# Patient Record
Sex: FEMALE | Race: WHITE | NOT HISPANIC OR LATINO | Employment: UNEMPLOYED | URBAN - METROPOLITAN AREA
[De-identification: names, ages, dates, MRNs, and addresses within clinical notes are randomized per-mention and may not be internally consistent; named-entity substitution may affect disease eponyms.]

---

## 2017-04-18 DIAGNOSIS — Z79.899 LONG-TERM USE OF HYDROXYCHLOROQUINE: ICD-10-CM

## 2017-04-18 RX ORDER — HYDROXYCHLOROQUINE SULFATE 200 MG/1
200 TABLET, FILM COATED ORAL 2 TIMES DAILY
Qty: 180 TABLET | Refills: 0 | Status: SHIPPED | OUTPATIENT
Start: 2017-04-18 | End: 2017-06-14 | Stop reason: SDUPTHER

## 2017-04-18 NOTE — TELEPHONE ENCOUNTER
Last refill.  She needs to be seen at least once a year, otherwise can get meds through primary care MD.

## 2017-06-14 ENCOUNTER — OFFICE VISIT (OUTPATIENT)
Dept: RHEUMATOLOGY | Facility: CLINIC | Age: 57
End: 2017-06-14
Payer: COMMERCIAL

## 2017-06-14 ENCOUNTER — CLINICAL SUPPORT (OUTPATIENT)
Dept: INFECTIOUS DISEASES | Facility: CLINIC | Age: 57
End: 2017-06-14
Payer: COMMERCIAL

## 2017-06-14 VITALS
DIASTOLIC BLOOD PRESSURE: 90 MMHG | HEIGHT: 67 IN | BODY MASS INDEX: 22.57 KG/M2 | HEART RATE: 80 BPM | WEIGHT: 143.81 LBS | SYSTOLIC BLOOD PRESSURE: 172 MMHG

## 2017-06-14 DIAGNOSIS — M81.0 OSTEOPOROSIS, UNSPECIFIED OSTEOPOROSIS TYPE, UNSPECIFIED PATHOLOGICAL FRACTURE PRESENCE: ICD-10-CM

## 2017-06-14 DIAGNOSIS — Z79.899 LONG-TERM USE OF HYDROXYCHLOROQUINE: ICD-10-CM

## 2017-06-14 DIAGNOSIS — R03.0 ELEVATED BLOOD PRESSURE READING: ICD-10-CM

## 2017-06-14 DIAGNOSIS — M35.01 SJOGREN'S SYNDROME WITH KERATOCONJUNCTIVITIS SICCA: ICD-10-CM

## 2017-06-14 DIAGNOSIS — M32.19 OTHER SYSTEMIC LUPUS ERYTHEMATOSUS WITH OTHER ORGAN INVOLVEMENT: Primary | ICD-10-CM

## 2017-06-14 PROCEDURE — 99999 PR PBB SHADOW E&M-EST. PATIENT-LVL IV: CPT | Mod: PBBFAC,,, | Performed by: INTERNAL MEDICINE

## 2017-06-14 PROCEDURE — 99999 PR PBB SHADOW E&M-EST. PATIENT-LVL I: CPT | Mod: PBBFAC,,,

## 2017-06-14 PROCEDURE — 90670 PCV13 VACCINE IM: CPT | Mod: S$GLB,,, | Performed by: INTERNAL MEDICINE

## 2017-06-14 PROCEDURE — 99215 OFFICE O/P EST HI 40 MIN: CPT | Mod: S$GLB,,, | Performed by: INTERNAL MEDICINE

## 2017-06-14 PROCEDURE — 90471 IMMUNIZATION ADMIN: CPT | Mod: S$GLB,,, | Performed by: INTERNAL MEDICINE

## 2017-06-14 RX ORDER — DEXTROAMPHETAMINE SACCHARATE, AMPHETAMINE ASPARTATE MONOHYDRATE, DEXTROAMPHETAMINE SULFATE AND AMPHETAMINE SULFATE 5; 5; 5; 5 MG/1; MG/1; MG/1; MG/1
20 CAPSULE, EXTENDED RELEASE ORAL EVERY MORNING
COMMUNITY

## 2017-06-14 RX ORDER — HYDROXYCHLOROQUINE SULFATE 200 MG/1
200 TABLET, FILM COATED ORAL 2 TIMES DAILY
Qty: 180 TABLET | Refills: 2 | Status: SHIPPED | OUTPATIENT
Start: 2017-06-14 | End: 2018-05-21 | Stop reason: SDUPTHER

## 2017-06-14 ASSESSMENT — ROUTINE ASSESSMENT OF PATIENT INDEX DATA (RAPID3)
PAIN SCORE: 1.5
TOTAL RAPID3 SCORE: 2.61
FATIGUE SCORE: 7
AM STIFFNESS SCORE: 1, YES
PATIENT GLOBAL ASSESSMENT SCORE: 6
PSYCHOLOGICAL DISTRESS SCORE: 5.5
MDHAQ FUNCTION SCORE: .1
WHEN YOU AWAKENED IN THE MORNING OVER THE LAST WEEK, PLEASE INDICATE THE AMOUNT OF TIME IT TAKES UNTIL YOU ARE AS LIMBER AS YOU WILL BE FOR THE DAY: 15 MINUTES

## 2017-06-14 ASSESSMENT — SYSTEMIC LUPUS ERYTHEMATOSUS DISEASE ACTIVITY INDEX (SLEDAI): TOTAL_SCORE: 0

## 2017-06-14 NOTE — PROGRESS NOTES
Ms. Cervantes is a 56-year-old lady with systemic lupus erythematosus and Sjogren syndrome on HCQ; takes evoxac & restasis infrequently & prn. She also has osteoporosis, fibromyalgia with anxiety/depression, attention deficit disorder, and number of other comorbidities.     She returns today for follow-up & for a refill of HCQ.  She is seen once a year. She is followed by Dr. Pradhan at the  Clinic.  She is upset because of the traffic coming here and her initial BP was 178/100.  She denies SLE sxs. Denies swollen, painful joints, dysphagia, photosensitivity, alopecia or rashes. Still gets Raynaud's in cold. She is still under stress. She was living in her mother's house but mother was taken to a Nursing Home and the house was sold and she has been living with one or another daughter.  This is not optimal for her but she cannot afford to have her own place. She is still not  as her  has her on his insurance. She continues with dryness in her eyes and mouth but they are controlled. She takes both restasis and evoxac on a prn basis. Sometimes evoxac makes her drool  AM stiffness only 15 minutes. Continues with difficulty sleeping, fatigue, anxiety and depression. Not compliant with alendronate and has not had a DXA.  (maternal hx of hip fx).      Current Outpatient Prescriptions on File Prior to Visit   Medication Sig Dispense Refill    aspirin (ECOTRIN) 81 MG EC tablet Take 81 mg by mouth once daily.        buPROPion (WELLBUTRIN XL) 300 MG 24 hr tablet Take 300 mg by mouth once daily.        cevimeline (EVOXAC) 30 mg capsule Take 1 capsule (30 mg total) by mouth 3 (three) times daily. 270 capsule 3    cycloSPORINE (RESTASIS) 0.05 % ophthalmic emulsion Place 1 drop into both eyes 2 (two) times daily.        hydroxychloroquine (PLAQUENIL) 200 mg tablet Take 1 tablet (200 mg total) by mouth 2 (two) times daily. 180 tablet 0    alendronate (FOSAMAX) 70 MG tablet Take 1 tablet (70 mg total) by mouth  every 7 days. 4 tablet 11     No current facility-administered medications on file prior to visit.      Allergies   Allergen Reactions    Demerol [Meperidine]       mother had cardiac arrest from Demerol     She is allergic to Demerol with unknown consequences.     Past Medical History:   Diagnosis Date    Acid reflux     Allergy     Anxiety     Attention deficit disorder of adult 8/2/2012    Depression 8/2/2012    Dry eyes     Dry mouth     Fibromyalgia 8/2/2012    Osteoarthritis 8/2/2012    Systemic lupus erythematosus 8/2/2012       Past Surgical History:   Procedure Laterality Date    COLON SURGERY      partial for tx of diverticulitis   S/P bilateral cataract extractions 11 & 12/14    REVIEW OF SYSTEMS:  General: no fever, chills; has much fatigue. Gained weight; unhappy about this.   Skin: no rashes, alopecia, nail changes  Eyes: no vision loss, changes, pain; + dry eyes; + red eyes  Ears/Nose: some tinnitus; no hearing loss, epistaxis; + dry mouth;   Mouth/Throat: no oral ulcers, dental problems, sore throat, dysphagia, odynophagia.  Respiratory: no cough, pleurisy, shortness of breath, hemoptysis, dyspnea on exertion  Cardiovascular: no chest pain, palpitations, claudication  Gastrointestinal:+ heartburn; + constipation; no pyrosis, vomiting, diarrhea, melena, hematochezia, bloating.  Genitourinary: no dysuria, hematuria, nocturia, urgency  Gynecologic: +/- hot flushes; no night sweats, vaginal discharge, dysmenorrhea, miscarriages. LMP 4/11.  Hematologic: no pallor, lymphadenopathy; occasional bruising.  Neurologic: has headaches, seizures, paresthesias, muscle weakness, confusion, cognitive dysfunction  Psychiatric: still has sleep disturbance and stress;  Still has anxiety & depression; denies sxs of psychosis;.    FAMILY HISTORY: paternal great aunt with SLE and thyroid disease; mom with osteoporosis who fx hip & had a CVA  Father had DVT and  pulmonary embolus; paternal grandmother with  "thyroid    disease and 1 brother with lymphoma. 1 brother with addiction problems in the past. Maternal    grandmother with depression. She has 4 daughters.  2 grandchildren.  Mother fx hip.   SOCIAL HX: lives with one or other daughter.       PHYSICAL EXAMINATION: This is a well-developed, well-nourished,    lady, in no acute distress. Oriented x3 with normal  affect and normal cognition.   VITAL SIGNS: Ht 5' 6.5" (1.689 m)   Wt 65.2 kg (143 lb 12.8 oz)   BMI 22.86 kg/m²    Initially 178/100; /90 at end of session;  HEENT: Mouth mildly dry some salivary pooling. No ulcers. No parotid gland   swelling. Teeth ok;   NECK: Supple with no adenopathy. No thyromegaly. Carotids with good   pulsations.   LUNGS: Clear to auscultation and percussion.   CARDIAC EXAM: Regular with no murmurs, gallops or rubs.   ABDOMEN: Soft and benign with no hepatosplenomegaly. No masses. No   tenderness.   EXTREMITIES: Without clubbing, cyanosis or peripheral edema.   JOINT EXAMINATION: FROM of all joints; no   synovitis anywhere; mild squaring of the first carpometacarpal   joints R>L.   NEUROLOGIC: DTRs ok;  Sensation is   intact to light touch. Motor 5/5 prox & distal upper & lower.   Cranial 2-12 unremarkable.   MSK: mild diffuse trap tendernss;  LYMPHATICS: no cervical, inguinal or axillary LDA.    LABS:   2/27/17: Labs [facility]: CBC ok; CMP ok; TSH ok;  July 2014 (see Media): CBC, CMP, TFTs ok;  8/2/12: ESR, CRP, CBC, CMP, Vit D ok; +DEYANIRA 1:1280H; neg pro; UA 17 WC  8/15/11: ESR, CRP, CBC ok; GFR 58;  12/20/10: ESR, CRP, CBC ok; CMP AST 45 (40); UA no pr.    DXA 8/2/12: previously reviewed: TLS -2; TFN -2.5; TTH -1.7    IMPRESSION:   Systemic lupus erythematous overlapping with Sjogren syndrome   characterized by,   a) Constitutional symptoms of arthralgia, nasal ulcers, malar rash,   photosensitivity, livedo reticularis by history, and Raynaud phenomenon.   b) Sicca symptoms, especially dryness of the eyes on " Restasis and evoxac.   c) Positive DEYANIRA 1:1280 homogeneous with negative profile.   d) Positive IgM anticardiolipin antibodies in the past 31.2 (12.4 negative   lupus anticoagulant).   F) Increased fatigue on running out of HCQ    Elevated BP    Fibromyalgia syndrome with tender points, anxiety, depression, fatigue, headaches and hx of interstitial cystitis, off trazodone, lyrica and cymbalta.   On Wellbutrin for depression.    Osteoporosis by BMD with family hx--   Still not compliant with alendronate.    History of intermittent left-sided paresthesias and pain associated with headaches and history of left-sided weakness and confusion,    no longer a major complaint except for the headaches     Attention deficit disorder,   on Adderall    Osteoarthritis of the first carpometacarpal joints, right hand.     Depression    on Wellbutrin      PLAN:   Track BP and record and consult Dr. Pradhan.  UA & U pr/cnne today  Lipid panel external.  DXA ordered external  Continue HCQ 14 tablets/week with periodic eye exams.  Will decide on alendronate after DXA.  Ok to use evoxac prn  Discussed depression/anxiety/sleep and what to do about them.   Daily dedicated low impact aerobic exercise.  Ok to rtc in 1 year as per her request unless she has problems.

## 2018-05-21 DIAGNOSIS — Z79.899 LONG-TERM USE OF HYDROXYCHLOROQUINE: ICD-10-CM

## 2018-05-21 DIAGNOSIS — M32.19 OTHER SYSTEMIC LUPUS ERYTHEMATOSUS WITH OTHER ORGAN INVOLVEMENT: ICD-10-CM

## 2018-05-21 RX ORDER — HYDROXYCHLOROQUINE SULFATE 200 MG/1
TABLET, FILM COATED ORAL
Qty: 60 TABLET | Refills: 1 | Status: SHIPPED | OUTPATIENT
Start: 2018-05-21 | End: 2018-07-05 | Stop reason: SDUPTHER

## 2018-07-05 ENCOUNTER — LAB VISIT (OUTPATIENT)
Dept: LAB | Facility: HOSPITAL | Age: 58
End: 2018-07-05
Attending: INTERNAL MEDICINE
Payer: COMMERCIAL

## 2018-07-05 ENCOUNTER — OFFICE VISIT (OUTPATIENT)
Dept: RHEUMATOLOGY | Facility: CLINIC | Age: 58
End: 2018-07-05
Payer: COMMERCIAL

## 2018-07-05 VITALS
DIASTOLIC BLOOD PRESSURE: 69 MMHG | HEART RATE: 76 BPM | HEIGHT: 67 IN | SYSTOLIC BLOOD PRESSURE: 112 MMHG | BODY MASS INDEX: 23.09 KG/M2 | WEIGHT: 147.13 LBS

## 2018-07-05 DIAGNOSIS — M81.0 OSTEOPOROSIS, UNSPECIFIED OSTEOPOROSIS TYPE, UNSPECIFIED PATHOLOGICAL FRACTURE PRESENCE: ICD-10-CM

## 2018-07-05 DIAGNOSIS — M32.19 OTHER SYSTEMIC LUPUS ERYTHEMATOSUS WITH OTHER ORGAN INVOLVEMENT: ICD-10-CM

## 2018-07-05 DIAGNOSIS — Z79.899 LONG-TERM USE OF HYDROXYCHLOROQUINE: ICD-10-CM

## 2018-07-05 DIAGNOSIS — M35.01 SJOGREN'S SYNDROME WITH KERATOCONJUNCTIVITIS SICCA: ICD-10-CM

## 2018-07-05 DIAGNOSIS — M32.19 OTHER SYSTEMIC LUPUS ERYTHEMATOSUS WITH OTHER ORGAN INVOLVEMENT: Primary | ICD-10-CM

## 2018-07-05 LAB
25(OH)D3+25(OH)D2 SERPL-MCNC: 44 NG/ML
ALBUMIN SERPL BCP-MCNC: 4.2 G/DL
ALP SERPL-CCNC: 108 U/L
ALT SERPL W/O P-5'-P-CCNC: 23 U/L
ANION GAP SERPL CALC-SCNC: 9 MMOL/L
AST SERPL-CCNC: 39 U/L
BASOPHILS # BLD AUTO: 0.03 K/UL
BASOPHILS NFR BLD: 0.5 %
BILIRUB SERPL-MCNC: 0.6 MG/DL
BUN SERPL-MCNC: 17 MG/DL
C3 SERPL-MCNC: 110 MG/DL
C4 SERPL-MCNC: 33 MG/DL
CALCIUM SERPL-MCNC: 9.9 MG/DL
CHLORIDE SERPL-SCNC: 103 MMOL/L
CO2 SERPL-SCNC: 29 MMOL/L
CREAT SERPL-MCNC: 1 MG/DL
CRP SERPL-MCNC: 3.2 MG/L
DIFFERENTIAL METHOD: ABNORMAL
EOSINOPHIL # BLD AUTO: 0.1 K/UL
EOSINOPHIL NFR BLD: 1.8 %
ERYTHROCYTE [DISTWIDTH] IN BLOOD BY AUTOMATED COUNT: 12.6 %
ERYTHROCYTE [SEDIMENTATION RATE] IN BLOOD BY WESTERGREN METHOD: 11 MM/HR
EST. GFR  (AFRICAN AMERICAN): >60 ML/MIN/1.73 M^2
EST. GFR  (NON AFRICAN AMERICAN): >60 ML/MIN/1.73 M^2
GLUCOSE SERPL-MCNC: 90 MG/DL
HCT VFR BLD AUTO: 40.6 %
HGB BLD-MCNC: 13.5 G/DL
IMM GRANULOCYTES # BLD AUTO: 0.01 K/UL
IMM GRANULOCYTES NFR BLD AUTO: 0.2 %
LYMPHOCYTES # BLD AUTO: 1.8 K/UL
LYMPHOCYTES NFR BLD: 29.3 %
MCH RBC QN AUTO: 31.7 PG
MCHC RBC AUTO-ENTMCNC: 33.3 G/DL
MCV RBC AUTO: 95 FL
MONOCYTES # BLD AUTO: 0.5 K/UL
MONOCYTES NFR BLD: 7.8 %
NEUTROPHILS # BLD AUTO: 3.8 K/UL
NEUTROPHILS NFR BLD: 60.4 %
NRBC BLD-RTO: 0 /100 WBC
PLATELET # BLD AUTO: 224 K/UL
PMV BLD AUTO: 10.6 FL
POTASSIUM SERPL-SCNC: 4.6 MMOL/L
PROT SERPL-MCNC: 7.2 G/DL
RBC # BLD AUTO: 4.26 M/UL
SODIUM SERPL-SCNC: 141 MMOL/L
WBC # BLD AUTO: 6.25 K/UL

## 2018-07-05 PROCEDURE — 86235 NUCLEAR ANTIGEN ANTIBODY: CPT | Mod: 59

## 2018-07-05 PROCEDURE — 82306 VITAMIN D 25 HYDROXY: CPT

## 2018-07-05 PROCEDURE — 85025 COMPLETE CBC W/AUTO DIFF WBC: CPT

## 2018-07-05 PROCEDURE — 3008F BODY MASS INDEX DOCD: CPT | Mod: CPTII,S$GLB,, | Performed by: INTERNAL MEDICINE

## 2018-07-05 PROCEDURE — 99999 PR PBB SHADOW E&M-EST. PATIENT-LVL III: CPT | Mod: PBBFAC,,, | Performed by: INTERNAL MEDICINE

## 2018-07-05 PROCEDURE — 36415 COLL VENOUS BLD VENIPUNCTURE: CPT

## 2018-07-05 PROCEDURE — 86160 COMPLEMENT ANTIGEN: CPT | Mod: 59

## 2018-07-05 PROCEDURE — 86140 C-REACTIVE PROTEIN: CPT

## 2018-07-05 PROCEDURE — 86160 COMPLEMENT ANTIGEN: CPT

## 2018-07-05 PROCEDURE — 99214 OFFICE O/P EST MOD 30 MIN: CPT | Mod: S$GLB,,, | Performed by: INTERNAL MEDICINE

## 2018-07-05 PROCEDURE — 85651 RBC SED RATE NONAUTOMATED: CPT

## 2018-07-05 PROCEDURE — 80053 COMPREHEN METABOLIC PANEL: CPT

## 2018-07-05 PROCEDURE — 86039 ANTINUCLEAR ANTIBODIES (ANA): CPT

## 2018-07-05 PROCEDURE — 86038 ANTINUCLEAR ANTIBODIES: CPT

## 2018-07-05 PROCEDURE — 86235 NUCLEAR ANTIGEN ANTIBODY: CPT

## 2018-07-05 RX ORDER — HYDROXYCHLOROQUINE SULFATE 200 MG/1
200 TABLET, FILM COATED ORAL 2 TIMES DAILY
Qty: 60 TABLET | Refills: 1 | Status: SHIPPED | OUTPATIENT
Start: 2018-07-05 | End: 2018-09-23 | Stop reason: SDUPTHER

## 2018-07-05 RX ORDER — LISINOPRIL 10 MG/1
10 TABLET ORAL DAILY
COMMUNITY

## 2018-07-05 ASSESSMENT — ROUTINE ASSESSMENT OF PATIENT INDEX DATA (RAPID3)
PAIN SCORE: 1.5
TOTAL RAPID3 SCORE: 2
FATIGUE SCORE: 7
WHEN YOU AWAKENED IN THE MORNING OVER THE LAST WEEK, PLEASE INDICATE THE AMOUNT OF TIME IT TAKES UNTIL YOU ARE AS LIMBER AS YOU WILL BE FOR THE DAY: 45 MINUTES
PATIENT GLOBAL ASSESSMENT SCORE: 4.5
PSYCHOLOGICAL DISTRESS SCORE: 4.4
AM STIFFNESS SCORE: 1, YES
MDHAQ FUNCTION SCORE: 0

## 2018-07-05 ASSESSMENT — SYSTEMIC LUPUS ERYTHEMATOSUS DISEASE ACTIVITY INDEX (SLEDAI): TOTAL_SCORE: 0

## 2018-07-05 NOTE — PATIENT INSTRUCTIONS
Fax us your latest DXA (Bone Density)  FAX: [phone]    Ask Dr. Pradhan to do a Lipid Panel next time labs are drawn.     Daily aerobic low impact exercise.

## 2018-07-05 NOTE — PROGRESS NOTES
Ms. Cervantes is a 58-year-old lady with systemic lupus erythematosus and Sjogren syndrome on HCQ; takes evoxac & restasis infrequently & prn. She also has osteoporosis, fibromyalgia with anxiety/depression, attention deficit disorder, and number of other comorbidities.     She returns today for follow-up. She was last seen on 6/14/17. She is seen once a year. Her PMD is Dr. Pradhan at the  Clinic.  She is doing very well SLE wise. Denies swollen, painful joints, dysphagia, photosensitivity, alopecia or rashes. Still gets Raynaud's in cold. She reports eye dryness and some mouth & she takes restasis and evoxac on a prn basis. Still reporting significant difficulty sleeping & also fatigue, anxiety and depression. Thinks she had a DXA but does not recall what she was told about it. Thinks she is taking fosamax.  Has maternal hx of hip fx. Complaining about her weight. Feels she is 20 lbs over what she wants to be.     Was started on lisinopril for BP control and now BP is good.    Current Outpatient Prescriptions   Medication Sig Dispense Refill    aspirin (ECOTRIN) 81 MG EC tablet Take 81 mg by mouth once daily.        buPROPion (WELLBUTRIN XL) 300 MG 24 hr tablet Take 300 mg by mouth once daily.        cevimeline (EVOXAC) 30 mg capsule Take 1 capsule (30 mg total) by mouth 3 (three) times daily. 270 capsule 3    cycloSPORINE (RESTASIS) 0.05 % ophthalmic emulsion Place 1 drop into both eyes 2 (two) times daily.        dextroamphetamine-amphetamine (ADDERALL XR) 20 MG 24 hr capsule Take 20 mg by mouth every morning.      hydroxychloroquine (PLAQUENIL) 200 mg tablet TAKE ONE TABLET BY MOUTH TWICE DAILY  60 tablet 1    lisinopril 10 MG tablet Take 10 mg by mouth once daily.      alendronate (FOSAMAX) 70 MG tablet Take 1 tablet (70 mg total) by mouth every 7 days. 4 tablet 11     No current facility-administered medications for this visit.        Allergies   Allergen Reactions    Demerol [Meperidine]       mother  had cardiac arrest from Demerol     She is allergic to Demerol with unknown consequences.     Past Medical History:   Diagnosis Date    Acid reflux     Allergy     Anxiety     Attention deficit disorder of adult 8/2/2012    Depression 8/2/2012    Dry eyes     Dry mouth     Fibromyalgia 8/2/2012    Osteoarthritis 8/2/2012    Systemic lupus erythematosus 8/2/2012       Past Surgical History:   Procedure Laterality Date    COLON SURGERY      partial for tx of diverticulitis   S/P bilateral cataract extractions 11 & 12/14    REVIEW OF SYSTEMS:  General: no fever, chills; has much fatigue; gained weight again.   Skin: no rashes, alopecia, nail changes  Eyes: no vision loss, changes, pain; + dry eyes; + red eyes  Ears/Nose: some tinnitus; no hearing loss, epistaxis; + dry mouth;   Mouth/Throat: no oral ulcers, dental problems, sore throat, dysphagia, odynophagia.  Respiratory: no cough, pleurisy, shortness of breath, hemoptysis, dyspnea on exertion  Cardiovascular: no chest pain, palpitations, claudication  Gastrointestinal:+ heartburn; + constipation; no pyrosis, vomiting, diarrhea, melena, hematochezia, bloating.  Genitourinary: no dysuria, hematuria, nocturia, urgency  Gynecologic:no hot flushes; no night sweats, vaginal discharge, dysmenorrhea, miscarriages. LMP 4/11.  Hematologic: no pallor, lymphadenopathy; occasional bruising.  Neurologic: still w headaches; no seizures, paresthesias, muscle weakness, confusion, cognitive dysfunction  Psychiatric: still has sleep disturbance and stress; anxiety & depression; denies sxs of psychosis;.    FAMILY HISTORY: paternal great aunt with SLE and thyroid disease; mom with osteoporosis who fx hip & had a CVA  Father had DVT and  pulmonary embolus; paternal grandmother with thyroid    disease and 1 brother with lymphoma. 1 brother with addiction problems in the past. Maternal    grandmother with depression. She has 4 daughters.  2 grandchildren.  Mother fx hip.  "  SOCIAL HX: non smoker; rare alcohol.      PHYSICAL EXAMINATION: This is a well-developed, well-nourished,    lady, in no acute distress. Oriented x3 with normal  affect and normal cognition.   VITAL SIGNS: /69   Pulse 76   Ht 5' 6.5" (1.689 m)   Wt 66.7 kg (147 lb 1.6 oz)   BMI 23.39 kg/m²     HEENT: Mouth with some salivary pooling. No ulcers. No parotid gland   swelling. Teeth ok;   NECK: Supple with no adenopathy. No thyromegaly. Carotids with good   pulsations.   LUNGS: Clear to auscultation and percussion.   CARDIAC EXAM: Regular with no murmurs, gallops or rubs.   ABDOMEN: Soft and benign with no hepatosplenomegaly. No masses. No   tenderness.   EXTREMITIES: Without clubbing, cyanosis or peripheral edema.   JOINT EXAMINATION: FROM of all joints; no   synovitis anywhere; mild squaring of the first carpometacarpal   joints R>L.   NEUROLOGIC: DTRs ok;  Sensation is   intact to light touch. Motor 5/5 prox & distal upper & lower.   Cranial 2-12 unremarkable.   MSK: mild diffuse trap tendernss;  LYMPHATICS: no cervical, inguinal or axillary LDA.    LABS:   6/14/17: UA ok; U pr/cnne 0.13;   2/27/17: Labs [facility]: CBC ok; CMP ok; TSH ok;  July 2014 (see Media): CBC, CMP, TFTs ok;  8/2/12: ESR, CRP, CBC, CMP, Vit D ok; +DEYANIRA 1:1280H; neg pro; UA 17 WC  8/15/11: ESR, CRP, CBC ok; GFR 58;  12/20/10: ESR, CRP, CBC ok; CMP AST 45 (40); UA no pr.    DXA 8/2/12: previously reviewed: TLS -2; TFN -2.5; TTH -1.7    IMPRESSION:   Systemic lupus erythematous overlapping with Sjogren syndrome   characterized by,   a) Constitutional symptoms of arthralgia, nasal ulcers, malar rash,   photosensitivity, livedo reticularis by history, and Raynaud phenomenon.   b) Sicca symptoms, especially dryness of the eyes on Restasis and evoxac.   c) Positive DEYANIRA 1:1280 homogeneous with negative profile.   d) Positive IgM anticardiolipin antibodies in the past 31.2 (12.4 negative   lupus anticoagulant).   F) " Increased fatigue on running out of HCQ    Osteoporosis by BMD with family hx   DXA 8/2/12: TLS -2; TFN -2.5; TTH -1.7   On alendronate?    Fibromyalgia syndrome with tender points, anxiety, depression, fatigue, headaches and hx of interstitial cystitis, off trazodone, lyrica and cymbalta.   On Wellbutrin for depression.    History of intermittent left-sided paresthesias and pain associated with headaches and history of left-sided weakness and confusion,    no longer a major complaint except for the headaches     Attention deficit disorder,   on Adderall    Osteoarthritis of the first carpometacarpal joints, right hand.     Depression    on Wellbutrin      PLAN:   Labs today.  Lipid panel will be obtained by Dr. Pradhan.  Continue HCQ 14 tablets/week with periodic eye exams.  Fax us DXA.  Ok to use evoxac prn  Again counseled on daily dedicated low impact aerobic exercise..   RTC 1 year or prn.

## 2018-07-06 LAB
ANA SER QL IF: POSITIVE
ANA TITR SER IF: NORMAL {TITER}
ANTI-SSA ANTIBODY: 0.55 EU
ANTI-SSA INTERPRETATION: NEGATIVE

## 2018-07-09 LAB
ANTI SM ANTIBODY: 0.34 EU
ANTI SM/RNP ANTIBODY: 1.06 EU
ANTI-SM INTERPRETATION: NEGATIVE
ANTI-SM/RNP INTERPRETATION: NEGATIVE
ANTI-SSA ANTIBODY: 0.55 EU
ANTI-SSA INTERPRETATION: NEGATIVE
ANTI-SSB ANTIBODY: 0.2 EU
ANTI-SSB INTERPRETATION: NEGATIVE
DSDNA AB SER-ACNC: NORMAL [IU]/ML

## 2018-09-23 DIAGNOSIS — Z79.899 LONG-TERM USE OF HYDROXYCHLOROQUINE: ICD-10-CM

## 2018-09-23 DIAGNOSIS — M35.01 SJOGREN'S SYNDROME WITH KERATOCONJUNCTIVITIS SICCA: ICD-10-CM

## 2018-09-23 DIAGNOSIS — M32.19 OTHER SYSTEMIC LUPUS ERYTHEMATOSUS WITH OTHER ORGAN INVOLVEMENT: ICD-10-CM

## 2018-09-23 RX ORDER — HYDROXYCHLOROQUINE SULFATE 200 MG/1
TABLET, FILM COATED ORAL
Qty: 60 TABLET | Refills: 0 | Status: SHIPPED | OUTPATIENT
Start: 2018-09-23 | End: 2018-11-07 | Stop reason: SDUPTHER

## 2018-11-07 DIAGNOSIS — M32.19 OTHER SYSTEMIC LUPUS ERYTHEMATOSUS WITH OTHER ORGAN INVOLVEMENT: ICD-10-CM

## 2018-11-07 DIAGNOSIS — M35.01 SJOGREN'S SYNDROME WITH KERATOCONJUNCTIVITIS SICCA: ICD-10-CM

## 2018-11-07 DIAGNOSIS — Z79.899 LONG-TERM USE OF HYDROXYCHLOROQUINE: ICD-10-CM

## 2018-11-07 RX ORDER — HYDROXYCHLOROQUINE SULFATE 200 MG/1
TABLET, FILM COATED ORAL
Qty: 60 TABLET | Refills: 0 | Status: SHIPPED | OUTPATIENT
Start: 2018-11-07 | End: 2018-12-04 | Stop reason: SDUPTHER

## 2018-12-04 DIAGNOSIS — Z79.899 LONG-TERM USE OF HYDROXYCHLOROQUINE: ICD-10-CM

## 2018-12-04 DIAGNOSIS — M32.19 OTHER SYSTEMIC LUPUS ERYTHEMATOSUS WITH OTHER ORGAN INVOLVEMENT: ICD-10-CM

## 2018-12-04 DIAGNOSIS — M35.01 SJOGREN'S SYNDROME WITH KERATOCONJUNCTIVITIS SICCA: ICD-10-CM

## 2018-12-04 RX ORDER — HYDROXYCHLOROQUINE SULFATE 200 MG/1
TABLET, FILM COATED ORAL
Qty: 60 TABLET | Refills: 0 | Status: SHIPPED | OUTPATIENT
Start: 2018-12-04 | End: 2019-01-15 | Stop reason: SDUPTHER

## 2019-01-15 DIAGNOSIS — Z79.899 LONG-TERM USE OF HYDROXYCHLOROQUINE: ICD-10-CM

## 2019-01-15 DIAGNOSIS — M35.01 SJOGREN'S SYNDROME WITH KERATOCONJUNCTIVITIS SICCA: ICD-10-CM

## 2019-01-15 DIAGNOSIS — M32.19 OTHER SYSTEMIC LUPUS ERYTHEMATOSUS WITH OTHER ORGAN INVOLVEMENT: ICD-10-CM

## 2019-01-15 RX ORDER — HYDROXYCHLOROQUINE SULFATE 200 MG/1
TABLET, FILM COATED ORAL
Qty: 60 TABLET | Refills: 0 | Status: SHIPPED | OUTPATIENT
Start: 2019-01-15 | End: 2019-02-18 | Stop reason: SDUPTHER

## 2019-02-18 DIAGNOSIS — M32.19 OTHER SYSTEMIC LUPUS ERYTHEMATOSUS WITH OTHER ORGAN INVOLVEMENT: ICD-10-CM

## 2019-02-18 DIAGNOSIS — M35.01 SJOGREN'S SYNDROME WITH KERATOCONJUNCTIVITIS SICCA: ICD-10-CM

## 2019-02-18 DIAGNOSIS — Z79.899 LONG-TERM USE OF HYDROXYCHLOROQUINE: ICD-10-CM

## 2019-02-18 RX ORDER — HYDROXYCHLOROQUINE SULFATE 200 MG/1
TABLET, FILM COATED ORAL
Qty: 60 TABLET | Refills: 0 | Status: SHIPPED | OUTPATIENT
Start: 2019-02-18 | End: 2019-03-17 | Stop reason: SDUPTHER

## 2019-03-17 DIAGNOSIS — M32.19 OTHER SYSTEMIC LUPUS ERYTHEMATOSUS WITH OTHER ORGAN INVOLVEMENT: ICD-10-CM

## 2019-03-17 DIAGNOSIS — M35.01 SJOGREN'S SYNDROME WITH KERATOCONJUNCTIVITIS SICCA: ICD-10-CM

## 2019-03-17 DIAGNOSIS — Z79.899 LONG-TERM USE OF HYDROXYCHLOROQUINE: ICD-10-CM

## 2019-03-17 RX ORDER — HYDROXYCHLOROQUINE SULFATE 200 MG/1
TABLET, FILM COATED ORAL
Qty: 60 TABLET | Refills: 0 | Status: SHIPPED | OUTPATIENT
Start: 2019-03-17 | End: 2019-04-19 | Stop reason: SDUPTHER

## 2019-04-19 DIAGNOSIS — M32.19 OTHER SYSTEMIC LUPUS ERYTHEMATOSUS WITH OTHER ORGAN INVOLVEMENT: ICD-10-CM

## 2019-04-19 DIAGNOSIS — Z79.899 LONG-TERM USE OF HYDROXYCHLOROQUINE: ICD-10-CM

## 2019-04-19 DIAGNOSIS — M35.01 SJOGREN'S SYNDROME WITH KERATOCONJUNCTIVITIS SICCA: ICD-10-CM

## 2019-04-19 RX ORDER — HYDROXYCHLOROQUINE SULFATE 200 MG/1
TABLET, FILM COATED ORAL
Qty: 60 TABLET | Refills: 0 | Status: SHIPPED | OUTPATIENT
Start: 2019-04-19 | End: 2019-05-19 | Stop reason: SDUPTHER

## 2019-05-19 DIAGNOSIS — Z79.899 LONG-TERM USE OF HYDROXYCHLOROQUINE: ICD-10-CM

## 2019-05-19 DIAGNOSIS — M35.01 SJOGREN'S SYNDROME WITH KERATOCONJUNCTIVITIS SICCA: ICD-10-CM

## 2019-05-19 DIAGNOSIS — M32.19 OTHER SYSTEMIC LUPUS ERYTHEMATOSUS WITH OTHER ORGAN INVOLVEMENT: ICD-10-CM

## 2019-05-19 RX ORDER — HYDROXYCHLOROQUINE SULFATE 200 MG/1
TABLET, FILM COATED ORAL
Qty: 60 TABLET | Refills: 0 | Status: SHIPPED | OUTPATIENT
Start: 2019-05-19 | End: 2019-06-22 | Stop reason: SDUPTHER

## 2019-06-22 DIAGNOSIS — Z79.899 LONG-TERM USE OF HYDROXYCHLOROQUINE: ICD-10-CM

## 2019-06-22 DIAGNOSIS — M35.01 SJOGREN'S SYNDROME WITH KERATOCONJUNCTIVITIS SICCA: ICD-10-CM

## 2019-06-22 DIAGNOSIS — M32.19 OTHER SYSTEMIC LUPUS ERYTHEMATOSUS WITH OTHER ORGAN INVOLVEMENT: ICD-10-CM

## 2019-06-23 RX ORDER — HYDROXYCHLOROQUINE SULFATE 200 MG/1
TABLET, FILM COATED ORAL
Qty: 60 TABLET | Refills: 0 | Status: SHIPPED | OUTPATIENT
Start: 2019-06-23 | End: 2019-07-23 | Stop reason: SDUPTHER

## 2019-07-23 DIAGNOSIS — Z79.899 LONG-TERM USE OF HYDROXYCHLOROQUINE: ICD-10-CM

## 2019-07-23 DIAGNOSIS — M32.19 OTHER SYSTEMIC LUPUS ERYTHEMATOSUS WITH OTHER ORGAN INVOLVEMENT: ICD-10-CM

## 2019-07-23 DIAGNOSIS — M35.01 SJOGREN'S SYNDROME WITH KERATOCONJUNCTIVITIS SICCA: ICD-10-CM

## 2019-07-23 RX ORDER — HYDROXYCHLOROQUINE SULFATE 200 MG/1
TABLET, FILM COATED ORAL
Qty: 60 TABLET | Refills: 0 | Status: SHIPPED | OUTPATIENT
Start: 2019-07-23 | End: 2019-08-20 | Stop reason: SDUPTHER

## 2019-08-20 DIAGNOSIS — Z79.899 LONG-TERM USE OF HYDROXYCHLOROQUINE: ICD-10-CM

## 2019-08-20 DIAGNOSIS — M32.19 OTHER SYSTEMIC LUPUS ERYTHEMATOSUS WITH OTHER ORGAN INVOLVEMENT: ICD-10-CM

## 2019-08-20 DIAGNOSIS — M35.01 SJOGREN'S SYNDROME WITH KERATOCONJUNCTIVITIS SICCA: ICD-10-CM

## 2019-08-20 RX ORDER — HYDROXYCHLOROQUINE SULFATE 200 MG/1
TABLET, FILM COATED ORAL
Qty: 60 TABLET | Refills: 0 | Status: SHIPPED | OUTPATIENT
Start: 2019-08-20 | End: 2019-09-30 | Stop reason: SDUPTHER

## 2019-09-30 DIAGNOSIS — Z79.899 LONG-TERM USE OF HYDROXYCHLOROQUINE: ICD-10-CM

## 2019-09-30 DIAGNOSIS — M35.01 SJOGREN'S SYNDROME WITH KERATOCONJUNCTIVITIS SICCA: ICD-10-CM

## 2019-09-30 DIAGNOSIS — M32.19 OTHER SYSTEMIC LUPUS ERYTHEMATOSUS WITH OTHER ORGAN INVOLVEMENT: ICD-10-CM

## 2019-09-30 RX ORDER — HYDROXYCHLOROQUINE SULFATE 200 MG/1
200 TABLET, FILM COATED ORAL 2 TIMES DAILY
Qty: 60 TABLET | Refills: 0 | Status: SHIPPED | OUTPATIENT
Start: 2019-09-30 | End: 2019-11-26 | Stop reason: SDUPTHER

## 2019-11-03 DIAGNOSIS — Z79.899 LONG-TERM USE OF HYDROXYCHLOROQUINE: ICD-10-CM

## 2019-11-03 DIAGNOSIS — M35.01 SJOGREN'S SYNDROME WITH KERATOCONJUNCTIVITIS SICCA: ICD-10-CM

## 2019-11-03 DIAGNOSIS — M32.19 OTHER SYSTEMIC LUPUS ERYTHEMATOSUS WITH OTHER ORGAN INVOLVEMENT: ICD-10-CM

## 2019-11-03 RX ORDER — HYDROXYCHLOROQUINE SULFATE 200 MG/1
TABLET, FILM COATED ORAL
Qty: 60 TABLET | Refills: 0 | OUTPATIENT
Start: 2019-11-03

## 2019-11-20 DIAGNOSIS — M35.01 SJOGREN'S SYNDROME WITH KERATOCONJUNCTIVITIS SICCA: ICD-10-CM

## 2019-11-20 DIAGNOSIS — Z79.899 LONG-TERM USE OF HYDROXYCHLOROQUINE: ICD-10-CM

## 2019-11-20 DIAGNOSIS — M32.19 OTHER SYSTEMIC LUPUS ERYTHEMATOSUS WITH OTHER ORGAN INVOLVEMENT: ICD-10-CM

## 2019-11-20 RX ORDER — HYDROXYCHLOROQUINE SULFATE 200 MG/1
TABLET, FILM COATED ORAL
Qty: 60 TABLET | Refills: 0 | OUTPATIENT
Start: 2019-11-20

## 2019-11-26 ENCOUNTER — TELEPHONE (OUTPATIENT)
Dept: RHEUMATOLOGY | Facility: CLINIC | Age: 59
End: 2019-11-26

## 2019-11-26 DIAGNOSIS — M35.01 SJOGREN'S SYNDROME WITH KERATOCONJUNCTIVITIS SICCA: ICD-10-CM

## 2019-11-26 DIAGNOSIS — Z79.899 LONG-TERM USE OF HYDROXYCHLOROQUINE: ICD-10-CM

## 2019-11-26 DIAGNOSIS — M32.19 OTHER SYSTEMIC LUPUS ERYTHEMATOSUS WITH OTHER ORGAN INVOLVEMENT: ICD-10-CM

## 2019-11-26 RX ORDER — HYDROXYCHLOROQUINE SULFATE 200 MG/1
200 TABLET, FILM COATED ORAL 2 TIMES DAILY
Qty: 60 TABLET | Refills: 3 | Status: SHIPPED | OUTPATIENT
Start: 2019-11-26 | End: 2020-03-21

## 2019-11-27 NOTE — TELEPHONE ENCOUNTER
----- Message from Mishel Huntley RN sent at 11/26/2019  5:44 PM CST -----  Contact: Pt  Patient has appointment scheduled for 2/5/20  ----- Message -----  From: Angle Kowalski  Sent: 11/25/2019  12:43 PM CST  To: [facility]ER[facility] Staff    Pt stated that the pharmacy won't refill hydroxychloroquine (PLAQUENIL) 200 mg tablet      Callback [phone] (home)

## 2020-02-01 NOTE — PROGRESS NOTES
Ms. Cervantes is a 59-year-old lady with systemic lupus erythematosus and Sjogren syndrome on HCQ; takes evoxac & restasis infrequently & prn. She also has osteoporosis, fibromyalgia with anxiety/depression, attention deficit disorder, and number of other morbidities.     She returns today for follow-up. She was last seen on 7/5/18. She needs to see us at least once a year and have eye exams if we are to continue to prescribe HCQ.  She still needs an eye exam which she states she will schedule ASAP.  She is doing very well. She denies any joint pain except for an area in her R antecubital fossa which has been hurting her on palpation. There is a small nodule here. She denies any trauma, (but may get labs drawn here).  Denies  dysphagia, photosensitivity, alopecia or rashes. Still gets Raynaud's in cold. She reports eye dryness and some mouth dryness & they are not very bothersome. She takes restasis and evoxac on a prn basis.    Has been exercising and feeling better.     Current Outpatient Medications   Medication Sig Dispense Refill    buPROPion (WELLBUTRIN XL) 300 MG 24 hr tablet Take 300 mg by mouth once daily.        dextroamphetamine-amphetamine (ADDERALL XR) 20 MG 24 hr capsule Take 20 mg by mouth every morning.      hydroxychloroquine (PLAQUENIL) 200 mg tablet Take 1 tablet (200 mg total) by mouth 2 (two) times daily. Last refill. Needs appt or other MDs can fill. 60 tablet 3    lisinopril 10 MG tablet Take 10 mg by mouth once daily.      cevimeline (EVOXAC) 30 mg capsule Take 1 capsule (30 mg total) by mouth 3 (three) times daily. (Patient not taking: Reported on 2/5/2020) 270 capsule 3    cycloSPORINE (RESTASIS) 0.05 % ophthalmic emulsion Place 1 drop into both eyes 2 (two) times daily.         No current facility-administered medications for this visit.          Allergies   Allergen Reactions    Demerol [Meperidine]       mother had cardiac arrest from Demerol     She is allergic to Demerol with  unknown consequences.     Past Medical History:   Diagnosis Date    Acid reflux     Allergy     Anxiety     Attention deficit disorder of adult 8/2/2012    Depression 8/2/2012    Dry eyes     Dry mouth     Fibromyalgia 8/2/2012    Osteoarthritis 8/2/2012    Systemic lupus erythematosus 8/2/2012       Past Surgical History:   Procedure Laterality Date    COLON SURGERY      partial for tx of diverticulitis   S/P bilateral cataract extractions 11 & 12/14    REVIEW OF SYSTEMS:  General: no fever, chills; has much fatigue; gained weight again.   Skin: no rashes, alopecia, nail changes  Eyes: no vision loss, changes, pain; + dry eyes;  Ears/Nose: some tinnitus; no hearing loss, epistaxis; + dry mouth;   Mouth/Throat: no oral ulcers, dental problems, sore throat, dysphagia, odynophagia.  Respiratory: no cough, pleurisy, shortness of breath, hemoptysis, dyspnea on exertion  Cardiovascular: no chest pain, palpitations, claudication  Gastrointestinal:+ heartburn; + constipation; no pyrosis, vomiting, diarrhea, melena, hematochezia, bloating.  Genitourinary: no dysuria, hematuria, nocturia, urgency  Gynecologic:no hot flushes; no night sweats, vaginal discharge, dysmenorrhea, miscarriages. LMP 4/11.  Hematologic: no pallor, lymphadenopathy; occasional bruising.  Neurologic: still w headaches; no seizures, paresthesias, muscle weakness, confusion, cognitive dysfunction  Psychiatric: sleep disturbance anxiety & depression have markedly improved.    FAMILY HISTORY: paternal great aunt with SLE and thyroid disease; mom with osteoporosis who fx hip & had a CVA  Father had DVT and  pulmonary embolus; paternal grandmother with thyroid    disease and 1 brother with lymphoma. 1 brother with addiction problems in the past.   Maternal  grandmother with depression. She has 4 daughters;  3 grandchildren.    SOCIAL HX: non smoker; rare alcohol.  Walking every day.   Not working, but taking care of grandchildren      PHYSICAL  "EXAMINATION: This is a well-developed, well-nourished,    lady, in no acute distress. Oriented x3 with normal  affect and normal cognition.   VITAL SIGNS:   /86   Pulse 78   Ht 5' 6.5" (1.689 m)   Wt 63.5 kg (139 lb 15.9 oz)   BMI 22.26 kg/m²     Was 147 lb 1.6 oz on 7/5/18  Was 125 lb 11.2 oz on 4/8/13  HEENT: Mouth with some salivary pooling. No ulcers. No parotid gland   swelling. Teeth ok;   NECK: Supple with no adenopathy. No thyromegaly. Carotids with good   pulsations.   LUNGS: Clear to auscultation and percussion.   CARDIAC EXAM: Regular with no murmurs, gallops or rubs.   ABDOMEN: Soft and benign with no hepatosplenomegaly. No masses. No   tenderness.   EXTREMITIES: Without clubbing, cyanosis or peripheral edema.   JOINT EXAMINATION: FROM of all joints; no   synovitis anywhere; mild squaring of the first carpometacarpal   joints R>L.; R antecubital fossa with tiny sc nodule TTP  NEUROLOGIC: DTRs ok;  Sensation is   intact to light touch. Motor 5/5 prox & distal upper & lower.   Cranial 2-12 unremarkable.   MSK: ok  LYMPHATICS: no cervical, inguinal or axillary LDA.    LABS:   7/5/18: ESR 11; CRP 3.2; CBC ok; CMP ok; Vit D 44; DEYANIRA 1:160 H; neg pro; C3 110; C4 33;   6/14/17: UA ok; U pr/cnne 0.13;   2/27/17: Labs [facility]: CBC ok; CMP ok; TSH ok;  July 2014 (see Media): CBC, CMP, TFTs ok;  8/2/12: ESR, CRP, CBC, CMP, Vit D ok; +DEYANIRA 1:1280H; neg pro; UA 17 WC  8/15/11: ESR, CRP, CBC ok; GFR 58;  12/20/10: ESR, CRP, CBC ok; CMP AST 45 (40); UA no pr.    DXA 8/2/12: previously reviewed: TLS -2; TFN -2.5; TTH -1.7    IMPRESSION:   Systemic lupus erythematous overlapping with Sjogren syndrome   characterized by,   a) Constitutional symptoms of arthralgia, nasal ulcers, malar rash,   photosensitivity, livedo reticularis by history, and Raynaud phenomenon.   b) Sicca symptoms, especially dryness of the eyes on Restasis and evoxac.   c) Positive DEYANIRA 1:1280 homogeneous with negative " profile.   d) Positive IgM anticardiolipin antibodies in the past 31.2 (12.4 negative   lupus anticoagulant).   F) Increased fatigue on running out of HCQ in past    R antecubital fossa nodule w pain   prob small vein thrombosis due to blood drawing.   R/O calcification    Osteoporosis by BMD with maternal hx of hip fx   DXA 8/2/12: TLS -2; TFN -2.5; TTH -1.7   Was on alendronate in past.    Hx of Vitamin D insufficiency--remotely    Fibromyalgia syndrome with tender points, anxiety, depression, fatigue, headaches and hx of interstitial cystitis, off trazodone, lyrica and cymbalta.   On Wellbutrin for depression.    History of intermittent left-sided paresthesias and pain associated with headaches and history of left-sided weakness and confusion,    no longer a major complaint     Attention deficit disorder,   on Adderall    Osteoarthritis of the first carpometacarpal joints, right hand.     Depression    on Wellbutrin      PLAN:   Labs today.  Lipid panel will be obtained by Dr. Pradhan.  Continue HCQ but drop to 200 mg/d.  Continue eye exams  Ok to use evoxac prn  Imaging of antecubital fossa  Asked her again to fax us her DXA  Continue exercising.   RTC 1 year or prn.        CC: Velasquez Pradhan MD    Addendum: 2/5/20: ESR 15; CRP 2.1; CBC hi indices; CMP cnne 1.1; GFR 55.1;     2/5/20: R elbow: personally viewed: unremarkable.     Addendum II: will inform patient not to take NSAIDs, but ok to take baby ASA 81 mg/d x 3 months to see if it will resolve R antecubital nodule/tenderness    Addendum III: DXA 8/31/17: TLS -2.8; TFN -2.7; TTH -1.9; will contact patient to recommend treatment.

## 2020-02-05 ENCOUNTER — HOSPITAL ENCOUNTER (OUTPATIENT)
Dept: RADIOLOGY | Facility: HOSPITAL | Age: 60
Discharge: HOME | End: 2020-02-05
Attending: INTERNAL MEDICINE
Payer: COMMERCIAL

## 2020-02-05 ENCOUNTER — OFFICE VISIT (OUTPATIENT)
Dept: RHEUMATOLOGY | Facility: CLINIC | Age: 60
End: 2020-02-05
Payer: COMMERCIAL

## 2020-02-05 VITALS
HEART RATE: 78 BPM | DIASTOLIC BLOOD PRESSURE: 86 MMHG | HEIGHT: 67 IN | SYSTOLIC BLOOD PRESSURE: 139 MMHG | BODY MASS INDEX: 21.97 KG/M2 | WEIGHT: 140 LBS

## 2020-02-05 DIAGNOSIS — Z79.899 LONG-TERM USE OF HYDROXYCHLOROQUINE: ICD-10-CM

## 2020-02-05 DIAGNOSIS — M35.01 SJOGREN'S SYNDROME WITH KERATOCONJUNCTIVITIS SICCA: ICD-10-CM

## 2020-02-05 DIAGNOSIS — Z55.9 EDUCATIONAL CIRCUMSTANCE: ICD-10-CM

## 2020-02-05 DIAGNOSIS — E55.9 VITAMIN D INSUFFICIENCY: ICD-10-CM

## 2020-02-05 DIAGNOSIS — I82.712: ICD-10-CM

## 2020-02-05 DIAGNOSIS — M32.19 OTHER SYSTEMIC LUPUS ERYTHEMATOSUS WITH OTHER ORGAN INVOLVEMENT: Primary | ICD-10-CM

## 2020-02-05 DIAGNOSIS — M81.0 OSTEOPOROSIS WITHOUT CURRENT PATHOLOGICAL FRACTURE, UNSPECIFIED OSTEOPOROSIS TYPE: ICD-10-CM

## 2020-02-05 PROCEDURE — 99999 PR PBB SHADOW E&M-EST. PATIENT-LVL III: ICD-10-PCS | Mod: PBBFAC,,, | Performed by: INTERNAL MEDICINE

## 2020-02-05 PROCEDURE — 3008F BODY MASS INDEX DOCD: CPT | Mod: CPTII,S$GLB,, | Performed by: INTERNAL MEDICINE

## 2020-02-05 PROCEDURE — 73070 X-RAY EXAM OF ELBOW: CPT | Mod: TC,RT

## 2020-02-05 PROCEDURE — 99214 OFFICE O/P EST MOD 30 MIN: CPT | Mod: S$GLB,,, | Performed by: INTERNAL MEDICINE

## 2020-02-05 PROCEDURE — 73070 X-RAY EXAM OF ELBOW: CPT | Mod: 26,RT,, | Performed by: SPECIALIST

## 2020-02-05 PROCEDURE — 73070 XR ELBOW 2 VIEWS RIGHT: ICD-10-PCS | Mod: 26,RT,, | Performed by: SPECIALIST

## 2020-02-05 PROCEDURE — 3008F PR BODY MASS INDEX (BMI) DOCUMENTED: ICD-10-PCS | Mod: CPTII,S$GLB,, | Performed by: INTERNAL MEDICINE

## 2020-02-05 PROCEDURE — 99999 PR PBB SHADOW E&M-EST. PATIENT-LVL III: CPT | Mod: PBBFAC,,, | Performed by: INTERNAL MEDICINE

## 2020-02-05 PROCEDURE — 99214 PR OFFICE/OUTPT VISIT, EST, LEVL IV, 30-39 MIN: ICD-10-PCS | Mod: S$GLB,,, | Performed by: INTERNAL MEDICINE

## 2020-02-05 SDOH — SOCIAL DETERMINANTS OF HEALTH (SDOH): PROBLEMS RELATED TO EDUCATION AND LITERACY, UNSPECIFIED: Z55.9

## 2020-02-05 ASSESSMENT — ROUTINE ASSESSMENT OF PATIENT INDEX DATA (RAPID3)
FATIGUE SCORE: 4
TOTAL RAPID3 SCORE: 1.26
WHEN YOU AWAKENED IN THE MORNING OVER THE LAST WEEK, PLEASE INDICATE THE AMOUNT OF TIME IT TAKES UNTIL YOU ARE AS LIMBER AS YOU WILL BE FOR THE DAY: 10MIN
PSYCHOLOGICAL DISTRESS SCORE: 4.4
PAIN SCORE: 1.1
PATIENT GLOBAL ASSESSMENT SCORE: 2
MDHAQ FUNCTION SCORE: .2
AM STIFFNESS SCORE: 1, YES

## 2020-02-05 ASSESSMENT — SYSTEMIC LUPUS ERYTHEMATOSUS DISEASE ACTIVITY INDEX (SLEDAI): TOTAL_SCORE: 0

## 2020-02-05 NOTE — PATIENT INSTRUCTIONS
Please follow up with Ophthalmology for hydroxychloroquine.  Drop hydroxychloroquine to 1 tablet daily & let us know if you should develop any rashes or other symptoms.    Send us the DXA or Bone Density: [phone]

## 2020-02-17 ENCOUNTER — PATIENT MESSAGE (OUTPATIENT)
Dept: RHEUMATOLOGY | Facility: CLINIC | Age: 60
End: 2020-02-17

## 2020-03-21 DIAGNOSIS — Z79.899 LONG-TERM USE OF HYDROXYCHLOROQUINE: ICD-10-CM

## 2020-03-21 DIAGNOSIS — M32.19 OTHER SYSTEMIC LUPUS ERYTHEMATOSUS WITH OTHER ORGAN INVOLVEMENT: ICD-10-CM

## 2020-03-21 DIAGNOSIS — M35.01 SJOGREN'S SYNDROME WITH KERATOCONJUNCTIVITIS SICCA: ICD-10-CM

## 2020-03-21 RX ORDER — HYDROXYCHLOROQUINE SULFATE 200 MG/1
TABLET, FILM COATED ORAL
Qty: 60 TABLET | Refills: 0 | Status: SHIPPED | OUTPATIENT
Start: 2020-03-21 | End: 2020-04-24

## 2020-04-24 DIAGNOSIS — M32.19 OTHER SYSTEMIC LUPUS ERYTHEMATOSUS WITH OTHER ORGAN INVOLVEMENT: ICD-10-CM

## 2020-04-24 DIAGNOSIS — M35.01 SJOGREN'S SYNDROME WITH KERATOCONJUNCTIVITIS SICCA: ICD-10-CM

## 2020-04-24 DIAGNOSIS — Z79.899 LONG-TERM USE OF HYDROXYCHLOROQUINE: ICD-10-CM

## 2020-04-24 RX ORDER — HYDROXYCHLOROQUINE SULFATE 200 MG/1
TABLET, FILM COATED ORAL
Qty: 60 TABLET | Refills: 0 | Status: SHIPPED | OUTPATIENT
Start: 2020-04-24 | End: 2020-05-23

## 2020-05-22 ENCOUNTER — PATIENT MESSAGE (OUTPATIENT)
Dept: RHEUMATOLOGY | Facility: CLINIC | Age: 60
End: 2020-05-22

## 2020-05-23 DIAGNOSIS — Z79.899 LONG-TERM USE OF HYDROXYCHLOROQUINE: ICD-10-CM

## 2020-05-23 DIAGNOSIS — M32.19 OTHER SYSTEMIC LUPUS ERYTHEMATOSUS WITH OTHER ORGAN INVOLVEMENT: ICD-10-CM

## 2020-05-23 DIAGNOSIS — M35.01 SJOGREN'S SYNDROME WITH KERATOCONJUNCTIVITIS SICCA: ICD-10-CM

## 2020-05-23 RX ORDER — HYDROXYCHLOROQUINE SULFATE 200 MG/1
TABLET, FILM COATED ORAL
Qty: 60 TABLET | Refills: 3 | Status: SHIPPED | OUTPATIENT
Start: 2020-05-23 | End: 2020-09-15

## 2021-02-23 ENCOUNTER — PATIENT MESSAGE (OUTPATIENT)
Dept: RHEUMATOLOGY | Facility: CLINIC | Age: 61
End: 2021-02-23

## 2021-03-12 ENCOUNTER — IMMUNIZATION (OUTPATIENT)
Dept: INTERNAL MEDICINE | Facility: CLINIC | Age: 61
End: 2021-03-12
Payer: COMMERCIAL

## 2021-03-12 DIAGNOSIS — Z23 NEED FOR VACCINATION: Primary | ICD-10-CM

## 2021-03-12 PROCEDURE — 91300 COVID-19, MRNA, LNP-S, PF, 30 MCG/0.3 ML DOSE VACCINE: CPT | Mod: PBBFAC | Performed by: FAMILY MEDICINE

## 2021-04-02 ENCOUNTER — IMMUNIZATION (OUTPATIENT)
Dept: INTERNAL MEDICINE | Facility: CLINIC | Age: 61
End: 2021-04-02
Payer: COMMERCIAL

## 2021-04-02 DIAGNOSIS — Z23 NEED FOR VACCINATION: Primary | ICD-10-CM

## 2021-04-02 PROCEDURE — 0002A COVID-19, MRNA, LNP-S, PF, 30 MCG/0.3 ML DOSE VACCINE: CPT | Mod: CV19,S$GLB,, | Performed by: FAMILY MEDICINE

## 2021-04-02 PROCEDURE — 0002A COVID-19, MRNA, LNP-S, PF, 30 MCG/0.3 ML DOSE VACCINE: ICD-10-PCS | Mod: CV19,S$GLB,, | Performed by: FAMILY MEDICINE

## 2021-04-02 PROCEDURE — 91300 COVID-19, MRNA, LNP-S, PF, 30 MCG/0.3 ML DOSE VACCINE: ICD-10-PCS | Mod: S$GLB,,, | Performed by: FAMILY MEDICINE

## 2021-04-02 PROCEDURE — 91300 COVID-19, MRNA, LNP-S, PF, 30 MCG/0.3 ML DOSE VACCINE: CPT | Mod: S$GLB,,, | Performed by: FAMILY MEDICINE

## 2021-04-06 ENCOUNTER — OFFICE VISIT (OUTPATIENT)
Dept: RHEUMATOLOGY | Facility: CLINIC | Age: 61
End: 2021-04-06
Payer: COMMERCIAL

## 2021-04-06 VITALS
SYSTOLIC BLOOD PRESSURE: 131 MMHG | WEIGHT: 147.69 LBS | BODY MASS INDEX: 23.48 KG/M2 | DIASTOLIC BLOOD PRESSURE: 79 MMHG | HEART RATE: 75 BPM

## 2021-04-06 DIAGNOSIS — M32.19 OTHER SYSTEMIC LUPUS ERYTHEMATOSUS WITH OTHER ORGAN INVOLVEMENT: Primary | ICD-10-CM

## 2021-04-06 DIAGNOSIS — E55.9 VITAMIN D INSUFFICIENCY: ICD-10-CM

## 2021-04-06 DIAGNOSIS — M35.01 SJOGREN'S SYNDROME WITH KERATOCONJUNCTIVITIS SICCA: ICD-10-CM

## 2021-04-06 DIAGNOSIS — Z79.899 LONG-TERM USE OF HYDROXYCHLOROQUINE: ICD-10-CM

## 2021-04-06 PROCEDURE — 3008F BODY MASS INDEX DOCD: CPT | Mod: CPTII,S$GLB,, | Performed by: INTERNAL MEDICINE

## 2021-04-06 PROCEDURE — 1126F AMNT PAIN NOTED NONE PRSNT: CPT | Mod: S$GLB,,, | Performed by: INTERNAL MEDICINE

## 2021-04-06 PROCEDURE — 99999 PR PBB SHADOW E&M-EST. PATIENT-LVL III: ICD-10-PCS | Mod: PBBFAC,,, | Performed by: INTERNAL MEDICINE

## 2021-04-06 PROCEDURE — 99214 OFFICE O/P EST MOD 30 MIN: CPT | Mod: S$GLB,,, | Performed by: INTERNAL MEDICINE

## 2021-04-06 PROCEDURE — 99999 PR PBB SHADOW E&M-EST. PATIENT-LVL III: CPT | Mod: PBBFAC,,, | Performed by: INTERNAL MEDICINE

## 2021-04-06 PROCEDURE — 99214 PR OFFICE/OUTPT VISIT, EST, LEVL IV, 30-39 MIN: ICD-10-PCS | Mod: S$GLB,,, | Performed by: INTERNAL MEDICINE

## 2021-04-06 PROCEDURE — 3008F PR BODY MASS INDEX (BMI) DOCUMENTED: ICD-10-PCS | Mod: CPTII,S$GLB,, | Performed by: INTERNAL MEDICINE

## 2021-04-06 PROCEDURE — 1126F PR PAIN SEVERITY QUANTIFIED, NO PAIN PRESENT: ICD-10-PCS | Mod: S$GLB,,, | Performed by: INTERNAL MEDICINE

## 2021-04-06 RX ORDER — HYDROXYCHLOROQUINE SULFATE 200 MG/1
200 TABLET, FILM COATED ORAL DAILY
Qty: 90 TABLET | Refills: 3 | Status: SHIPPED | OUTPATIENT
Start: 2021-04-06 | End: 2022-03-05

## 2021-04-06 RX ORDER — HYDROCHLOROTHIAZIDE 25 MG/1
25 TABLET ORAL
COMMUNITY
Start: 2021-03-24 | End: 2021-04-28

## 2021-04-06 ASSESSMENT — ROUTINE ASSESSMENT OF PATIENT INDEX DATA (RAPID3)
PATIENT GLOBAL ASSESSMENT SCORE: 3
PAIN SCORE: 2
TOTAL RAPID3 SCORE: 1.67
FATIGUE SCORE: 8
PSYCHOLOGICAL DISTRESS SCORE: 2.2
AM STIFFNESS SCORE: 1, YES
MDHAQ FUNCTION SCORE: 0

## 2021-04-06 ASSESSMENT — SYSTEMIC LUPUS ERYTHEMATOSUS DISEASE ACTIVITY INDEX (SLEDAI): TOTAL_SCORE: 0

## 2021-04-14 ENCOUNTER — PATIENT MESSAGE (OUTPATIENT)
Dept: RHEUMATOLOGY | Facility: CLINIC | Age: 61
End: 2021-04-14

## 2021-04-14 ENCOUNTER — LAB VISIT (OUTPATIENT)
Dept: LAB | Facility: HOSPITAL | Age: 61
End: 2021-04-14
Attending: INTERNAL MEDICINE
Payer: COMMERCIAL

## 2021-04-14 DIAGNOSIS — M32.19 OTHER SYSTEMIC LUPUS ERYTHEMATOSUS WITH OTHER ORGAN INVOLVEMENT: ICD-10-CM

## 2021-04-14 DIAGNOSIS — E55.9 VITAMIN D INSUFFICIENCY: ICD-10-CM

## 2021-04-14 LAB
25(OH)D3+25(OH)D2 SERPL-MCNC: 48 NG/ML (ref 30–96)
ALBUMIN SERPL BCP-MCNC: 3.8 G/DL (ref 3.5–5.2)
ALP SERPL-CCNC: 123 U/L (ref 55–135)
ALT SERPL W/O P-5'-P-CCNC: 20 U/L (ref 10–44)
ANION GAP SERPL CALC-SCNC: 9 MMOL/L (ref 8–16)
AST SERPL-CCNC: 30 U/L (ref 10–40)
BASOPHILS # BLD AUTO: 0.05 K/UL (ref 0–0.2)
BASOPHILS NFR BLD: 0.8 % (ref 0–1.9)
BILIRUB SERPL-MCNC: 0.4 MG/DL (ref 0.1–1)
BUN SERPL-MCNC: 24 MG/DL (ref 6–20)
CALCIUM SERPL-MCNC: 9 MG/DL (ref 8.7–10.5)
CHLORIDE SERPL-SCNC: 103 MMOL/L (ref 95–110)
CHOLEST SERPL-MCNC: 229 MG/DL (ref 120–199)
CHOLEST/HDLC SERPL: 2.4 {RATIO} (ref 2–5)
CO2 SERPL-SCNC: 29 MMOL/L (ref 23–29)
CREAT SERPL-MCNC: 1.2 MG/DL (ref 0.5–1.4)
CRP SERPL-MCNC: 1.9 MG/L (ref 0–8.2)
DIFFERENTIAL METHOD: ABNORMAL
EOSINOPHIL # BLD AUTO: 0.2 K/UL (ref 0–0.5)
EOSINOPHIL NFR BLD: 2.6 % (ref 0–8)
ERYTHROCYTE [DISTWIDTH] IN BLOOD BY AUTOMATED COUNT: 12.1 % (ref 11.5–14.5)
ERYTHROCYTE [SEDIMENTATION RATE] IN BLOOD BY WESTERGREN METHOD: 11 MM/HR (ref 0–20)
EST. GFR  (AFRICAN AMERICAN): 56.8 ML/MIN/1.73 M^2
EST. GFR  (NON AFRICAN AMERICAN): 49.2 ML/MIN/1.73 M^2
GLUCOSE SERPL-MCNC: 87 MG/DL (ref 70–110)
HCT VFR BLD AUTO: 39.9 % (ref 37–48.5)
HDLC SERPL-MCNC: 94 MG/DL (ref 40–75)
HDLC SERPL: 41 % (ref 20–50)
HGB BLD-MCNC: 13 G/DL (ref 12–16)
IMM GRANULOCYTES # BLD AUTO: 0.03 K/UL (ref 0–0.04)
IMM GRANULOCYTES NFR BLD AUTO: 0.5 % (ref 0–0.5)
LDLC SERPL CALC-MCNC: 124.2 MG/DL (ref 63–159)
LYMPHOCYTES # BLD AUTO: 1.6 K/UL (ref 1–4.8)
LYMPHOCYTES NFR BLD: 25.2 % (ref 18–48)
MCH RBC QN AUTO: 31.9 PG (ref 27–31)
MCHC RBC AUTO-ENTMCNC: 32.6 G/DL (ref 32–36)
MCV RBC AUTO: 98 FL (ref 82–98)
MONOCYTES # BLD AUTO: 0.5 K/UL (ref 0.3–1)
MONOCYTES NFR BLD: 8 % (ref 4–15)
NEUTROPHILS # BLD AUTO: 3.9 K/UL (ref 1.8–7.7)
NEUTROPHILS NFR BLD: 62.9 % (ref 38–73)
NONHDLC SERPL-MCNC: 135 MG/DL
NRBC BLD-RTO: 0 /100 WBC
PLATELET # BLD AUTO: 261 K/UL (ref 150–450)
PMV BLD AUTO: 10.2 FL (ref 9.2–12.9)
POTASSIUM SERPL-SCNC: 4.1 MMOL/L (ref 3.5–5.1)
PROT SERPL-MCNC: 7 G/DL (ref 6–8.4)
RBC # BLD AUTO: 4.08 M/UL (ref 4–5.4)
SODIUM SERPL-SCNC: 141 MMOL/L (ref 136–145)
TRIGL SERPL-MCNC: 54 MG/DL (ref 30–150)
WBC # BLD AUTO: 6.23 K/UL (ref 3.9–12.7)

## 2021-04-14 PROCEDURE — 82306 VITAMIN D 25 HYDROXY: CPT | Performed by: INTERNAL MEDICINE

## 2021-04-14 PROCEDURE — 85025 COMPLETE CBC W/AUTO DIFF WBC: CPT | Performed by: INTERNAL MEDICINE

## 2021-04-14 PROCEDURE — 86235 NUCLEAR ANTIGEN ANTIBODY: CPT | Performed by: INTERNAL MEDICINE

## 2021-04-14 PROCEDURE — 86140 C-REACTIVE PROTEIN: CPT | Performed by: INTERNAL MEDICINE

## 2021-04-14 PROCEDURE — 80061 LIPID PANEL: CPT | Performed by: INTERNAL MEDICINE

## 2021-04-14 PROCEDURE — 83520 IMMUNOASSAY QUANT NOS NONAB: CPT | Performed by: INTERNAL MEDICINE

## 2021-04-14 PROCEDURE — 80053 COMPREHEN METABOLIC PANEL: CPT | Performed by: INTERNAL MEDICINE

## 2021-04-14 PROCEDURE — 36415 COLL VENOUS BLD VENIPUNCTURE: CPT | Performed by: INTERNAL MEDICINE

## 2021-04-14 PROCEDURE — 85651 RBC SED RATE NONAUTOMATED: CPT | Performed by: INTERNAL MEDICINE

## 2021-04-16 LAB — ENA SCL70 AB SER-ACNC: 2 UNITS

## 2021-04-20 LAB — RNAP III AB SER-ACNC: <20 UNITS

## 2021-04-28 ENCOUNTER — OFFICE VISIT (OUTPATIENT)
Dept: RHEUMATOLOGY | Facility: CLINIC | Age: 61
End: 2021-04-28
Payer: COMMERCIAL

## 2021-04-28 DIAGNOSIS — M81.0 OSTEOPOROSIS WITHOUT CURRENT PATHOLOGICAL FRACTURE, UNSPECIFIED OSTEOPOROSIS TYPE: Primary | ICD-10-CM

## 2021-04-28 DIAGNOSIS — Z55.9 EDUCATIONAL CIRCUMSTANCE: ICD-10-CM

## 2021-04-28 PROCEDURE — 99213 PR OFFICE/OUTPT VISIT, EST, LEVL III, 20-29 MIN: ICD-10-PCS | Mod: 95,,, | Performed by: INTERNAL MEDICINE

## 2021-04-28 PROCEDURE — 1126F AMNT PAIN NOTED NONE PRSNT: CPT | Mod: ,,, | Performed by: INTERNAL MEDICINE

## 2021-04-28 PROCEDURE — 1126F PR PAIN SEVERITY QUANTIFIED, NO PAIN PRESENT: ICD-10-PCS | Mod: ,,, | Performed by: INTERNAL MEDICINE

## 2021-04-28 PROCEDURE — 99213 OFFICE O/P EST LOW 20 MIN: CPT | Mod: 95,,, | Performed by: INTERNAL MEDICINE

## 2021-04-28 RX ORDER — ALENDRONATE SODIUM 70 MG/1
70 TABLET ORAL
Qty: 12 TABLET | Refills: 3 | Status: SHIPPED | OUTPATIENT
Start: 2021-04-28 | End: 2022-04-28

## 2021-04-28 SDOH — SOCIAL DETERMINANTS OF HEALTH (SDOH): PROBLEMS RELATED TO EDUCATION AND LITERACY, UNSPECIFIED: Z55.9

## 2022-02-23 DIAGNOSIS — D84.9 IMMUNOSUPPRESSED STATUS: ICD-10-CM

## 2022-03-05 DIAGNOSIS — M35.01 SJOGREN'S SYNDROME WITH KERATOCONJUNCTIVITIS SICCA: ICD-10-CM

## 2022-03-05 DIAGNOSIS — M32.19 OTHER SYSTEMIC LUPUS ERYTHEMATOSUS WITH OTHER ORGAN INVOLVEMENT: ICD-10-CM

## 2022-03-05 DIAGNOSIS — Z79.899 LONG-TERM USE OF HYDROXYCHLOROQUINE: ICD-10-CM

## 2022-03-05 RX ORDER — HYDROXYCHLOROQUINE SULFATE 200 MG/1
TABLET, FILM COATED ORAL
Qty: 90 TABLET | Refills: 0 | Status: SHIPPED | OUTPATIENT
Start: 2022-03-05

## 2024-10-24 ENCOUNTER — PATIENT MESSAGE (OUTPATIENT)
Dept: GASTROENTEROLOGY | Facility: CLINIC | Age: 64
End: 2024-10-24
Payer: COMMERCIAL